# Patient Record
(demographics unavailable — no encounter records)

---

## 2024-11-01 NOTE — PHYSICAL EXAM
[Midline] : trachea located in midline position [Normal] : assessment of respiratory effort is normal [de-identified] : TM appear intact, non-erythematous, with mucoid effusion present bilaterally

## 2024-11-01 NOTE — HISTORY OF PRESENT ILLNESS
[FreeTextEntry1] : Patient returns today for re-eval of middle ear effusion, and previous AOM. Continues to have nasal congestion intermittently. Patient mom states he has been doing well and has had no infections recently. Denies further complaints.

## 2024-11-01 NOTE — PHYSICAL EXAM
[Midline] : trachea located in midline position [Normal] : assessment of respiratory effort is normal [de-identified] : TM appear intact, non-erythematous, with mucoid effusion present bilaterally

## 2024-11-01 NOTE — ASSESSMENT
[FreeTextEntry1] : Bilateral middle ear effusion and eustachian tube dysfunction bilaterally - still with AOM today Recommend bilateral myringotomy with insertion of tymponostomy tubes for eustachian tube dysfunction. Risks, benefits and alternatives were discussed at length.  Risks include but are not limited to: bleeding, infection, hearing loss, retained ear tubes, ear drum perforation, need for further procedures.  Benefits include improved middle ear ventilation with reduced frequency / intensity of ear infections and improved hearing. Alternatives include watchful waiting and medical management with antibiotics for each new episode of middle ear infection. All questions answered to parents satisfaction and informed consent signed.

## 2025-01-15 NOTE — PHYSICAL EXAM
[de-identified] : TM appear intact, Ziegler tubes in place bilaterally and patent. no effusion.  [Midline] : trachea located in midline position [Normal] : assessment of respiratory effort is normal

## 2025-01-15 NOTE — HISTORY OF PRESENT ILLNESS
[FreeTextEntry1] : Patient returns today s/p bilateral myringotomy with tubes 12/9/24. Accompanied by mother. Mother states that he is doing well. Occasionally tugging at his ear. Nasal congestion has improved. Also, he was irritable the last two days, but she is not sure if it is due to him teething. Here today for evaluation.

## 2025-01-15 NOTE — REASON FOR VISIT
[Post-Operative Visit] : a post-operative visit [FreeTextEntry2] : s/p bilateral myringotomy with tubes

## 2025-01-15 NOTE — PHYSICAL EXAM
[de-identified] : TM appear intact, Ziegler tubes in place bilaterally and patent. no effusion.  [Midline] : trachea located in midline position [Normal] : assessment of respiratory effort is normal

## 2025-01-15 NOTE — ASSESSMENT
[FreeTextEntry1] : s/p BMT.  Doing well.  Follow up in 3 months for next ear tube check.     Raf Scott MD, MPH Director of Pediatric Otolaryngology [facility] / [facility]